# Patient Record
Sex: MALE
[De-identification: names, ages, dates, MRNs, and addresses within clinical notes are randomized per-mention and may not be internally consistent; named-entity substitution may affect disease eponyms.]

---

## 2018-01-17 ENCOUNTER — HOSPITAL ENCOUNTER (OUTPATIENT)
Dept: HOSPITAL 91 - VAS | Age: 78
Discharge: HOME | End: 2018-01-17
Payer: MEDICARE

## 2018-01-17 ENCOUNTER — HOSPITAL ENCOUNTER (OUTPATIENT)
Age: 78
Discharge: HOME | End: 2018-01-17

## 2018-01-17 DIAGNOSIS — M79.604: ICD-10-CM

## 2018-01-17 DIAGNOSIS — M79.605: Primary | ICD-10-CM

## 2018-01-17 PROCEDURE — 93922 UPR/L XTREMITY ART 2 LEVELS: CPT

## 2021-03-30 ENCOUNTER — OFFICE (OUTPATIENT)
Dept: URBAN - METROPOLITAN AREA CLINIC 45 | Facility: CLINIC | Age: 81
End: 2021-03-30

## 2021-03-30 VITALS — HEIGHT: 70 IN

## 2021-03-30 DIAGNOSIS — R13.19 ESOPHAGEAL DYSPHAGIA: ICD-10-CM

## 2021-03-30 DIAGNOSIS — K62.5 RECTAL BLEEDING: ICD-10-CM

## 2021-03-30 DIAGNOSIS — K21.9 GASTROESOPHAGEAL REFLUX DISEASE: ICD-10-CM

## 2021-03-30 DIAGNOSIS — Z80.0 FAMILY HISTORY OF COLON CANCER: ICD-10-CM

## 2021-03-30 PROCEDURE — 99442 TELEPHONE SERVICE (11-20MINS): CPT | Performed by: INTERNAL MEDICINE

## 2021-03-30 PROCEDURE — 99214 OFFICE O/P EST MOD 30 MIN: CPT | Performed by: INTERNAL MEDICINE

## 2021-03-30 NOTE — SERVICEHPINOTES
The patient is scheduled today for a telehealth visit, due to current mandate for social distancing on account of the COVID-19 Pandemic. The clinician is connected to a secure network. The patient consents to this teleconference being a billable service.   phone   Follow-up of GERD was discussed.   The patient has typically complained of   heartburn, regurgitation and dysphagia  .      Treatment has consisted of   Prilosec  taken at   once daily  .   This therapy has been associated with   partial   relief.   The patient   has   been having breakthru GERD symptoms.  Symptoms do   not awaken the patient from sleep  .    Has been treating residual symptoms with   OTC antacids  .   Continuing symptoms may be brought on by   nothing specific  .    Main issue is dysphagia to solid food about 1-2x month. Foof feels like it gets stuck upper esophagus. 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 Pt denies any n/v, wt loss, fever or chills. Pt has regular bms however sees a sm amt of blood in the stool almost with every bm. Blood seems to be separate and coating the stool. Has hx of hemorrhoids and rectal fistula. Pt has seen DR Sagastume in the past for cardiac related issues

## 2021-06-02 VITALS
DIASTOLIC BLOOD PRESSURE: 113 MMHG | SYSTOLIC BLOOD PRESSURE: 187 MMHG | OXYGEN SATURATION: 95 % | RESPIRATION RATE: 16 BRPM | TEMPERATURE: 97.5 F | HEART RATE: 82 BPM | WEIGHT: 190 LBS | HEIGHT: 70 IN

## 2021-06-04 ENCOUNTER — AMBULATORY SURGICAL CENTER (OUTPATIENT)
Dept: URBAN - METROPOLITAN AREA SURGERY 37 | Facility: SURGERY | Age: 81
End: 2021-06-04

## 2021-06-04 DIAGNOSIS — K31.89 OTHER DISEASES OF STOMACH AND DUODENUM: ICD-10-CM

## 2021-06-04 DIAGNOSIS — Z80.0 FAMILY HISTORY OF MALIGNANT NEOPLASM OF DIGESTIVE ORGANS: ICD-10-CM

## 2021-06-04 DIAGNOSIS — K22.2 ESOPHAGEAL OBSTRUCTION: ICD-10-CM

## 2021-06-04 LAB — SURGICAL: PDF REPORT: (no result)

## 2021-06-04 PROCEDURE — 45378 DIAGNOSTIC COLONOSCOPY: CPT | Performed by: INTERNAL MEDICINE

## 2021-06-04 PROCEDURE — 43239 EGD BIOPSY SINGLE/MULTIPLE: CPT | Mod: 59 | Performed by: INTERNAL MEDICINE

## 2021-06-04 PROCEDURE — 43249 ESOPH EGD DILATION <30 MM: CPT | Performed by: INTERNAL MEDICINE

## 2021-06-04 NOTE — SERVICEHPINOTES
The patient is scheduled today for a telehealth visit, due to current mandate for social distancing on account of the COVID-19 Pandemic. The clinician is connected to a secure network. The patient consents to this teleconference being a billable service. phone Follow-up of GERD was discussed. The patient has typically complained of heartburn, regurgitation and dysphagia. Treatment has consisted of Prilosec taken at once daily. This therapy has been associated with partial relief. The patient has been having breakthru GERD symptoms. Symptoms do not awaken the patient from sleep. Has been treating residual symptoms with OTC antacids. Continuing symptoms may be brought on by nothing specific. Main issue is dysphagia to solid food about 1-2x month. Foof feels like it gets stuck upper esophagus. 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 Pt denies any n/v, wt loss, fever or chills. Pt has regular bms however sees a sm amt of blood in the stool almost with every bm. Blood seems to be separate and coating the stool. Has hx of hemorrhoids and rectal fistula. Pt has seen DR Sagastume in the past for cardiac related issues

## 2021-07-07 ENCOUNTER — OFFICE (OUTPATIENT)
Dept: URBAN - METROPOLITAN AREA CLINIC 45 | Facility: CLINIC | Age: 81
End: 2021-07-07

## 2021-07-07 VITALS
DIASTOLIC BLOOD PRESSURE: 89 MMHG | HEART RATE: 87 BPM | TEMPERATURE: 97.8 F | SYSTOLIC BLOOD PRESSURE: 134 MMHG | HEIGHT: 70 IN | WEIGHT: 192 LBS

## 2021-07-07 DIAGNOSIS — K64.8 HEMORRHOIDS, INTERNAL W/ BLEEDING: ICD-10-CM

## 2021-07-07 DIAGNOSIS — K62.5 HISTORY OF RECTAL BLEEDING: ICD-10-CM

## 2021-07-07 DIAGNOSIS — K57.30 DVRTCLOS OF LG INT W/O PERFORATION OR ABSCESS W/O BLEEDING: ICD-10-CM

## 2021-07-07 DIAGNOSIS — K29.70 GASTRITIS, UNSPECIFIED, WITHOUT BLEEDING: ICD-10-CM

## 2021-07-07 DIAGNOSIS — I25.10 CORONARY ARTERY DISEASE: ICD-10-CM

## 2021-07-07 DIAGNOSIS — K21.9 GASTROESOPHAGEAL REFLUX DISEASE: ICD-10-CM

## 2021-07-07 PROCEDURE — 99213 OFFICE O/P EST LOW 20 MIN: CPT | Performed by: INTERNAL MEDICINE

## 2021-07-07 RX ORDER — HYDROCORTISONE ACETATE AND PRAMOXINE HYDROCHLORIDE 25; 10 MG/G; MG/G
CREAM TOPICAL
Qty: 30 | Status: ACTIVE
Start: 2021-07-07

## 2021-07-07 NOTE — SERVICEHPINOTES
Follow-up of GERD was discussed.   The patient has typically complained of   heartburn, regurgitation and dysphagia  .      Treatment has consisted of   Omeprazole  taken at   once daily  .   This therapy has been associated with   good   relief.   The patient   has not   been having breakthru GERD symptoms.  Symptoms do   not awaken the patient from sleep  .    Has been treating residual symptoms with   OTC antacids  .   Continuing symptoms may be brought on by   nothing specific  .   Recent egd with Schatzki ring sp dilation. Pt states that swallowing better now. Egd bxs with gerd/gastritis. No Palomo's or H pylori. Colonoscopy with mild divertics and small int hemorrhoids. Has occ rectal bleeding.